# Patient Record
Sex: FEMALE | Race: OTHER | HISPANIC OR LATINO | Employment: UNEMPLOYED | URBAN - METROPOLITAN AREA
[De-identification: names, ages, dates, MRNs, and addresses within clinical notes are randomized per-mention and may not be internally consistent; named-entity substitution may affect disease eponyms.]

---

## 2023-07-01 ENCOUNTER — HOSPITAL ENCOUNTER (EMERGENCY)
Facility: HOSPITAL | Age: 28
Discharge: HOME | End: 2023-07-01
Attending: EMERGENCY MEDICINE | Admitting: EMERGENCY MEDICINE
Payer: MEDICAID

## 2023-07-01 ENCOUNTER — APPOINTMENT (EMERGENCY)
Dept: ULTRASOUND IMAGING | Facility: HOSPITAL | Age: 28
End: 2023-07-01
Payer: MEDICAID

## 2023-07-01 VITALS
TEMPERATURE: 98.5 F | OXYGEN SATURATION: 100 % | RESPIRATION RATE: 16 BRPM | HEART RATE: 81 BPM | SYSTOLIC BLOOD PRESSURE: 139 MMHG | DIASTOLIC BLOOD PRESSURE: 80 MMHG

## 2023-07-01 DIAGNOSIS — O41.8X90 SUBCHORIONIC HEMORRHAGE: ICD-10-CM

## 2023-07-01 DIAGNOSIS — O46.8X9 SUBCHORIONIC HEMORRHAGE: ICD-10-CM

## 2023-07-01 DIAGNOSIS — O46.90 VAGINAL BLEEDING DURING PREGNANCY: Primary | ICD-10-CM

## 2023-07-01 LAB
ALBUMIN SERPL BCP-MCNC: 3.1 G/DL (ref 3.5–5)
ALP SERPL-CCNC: 39 U/L (ref 34–104)
ALT SERPL W P-5'-P-CCNC: 9 U/L (ref 7–52)
ANION GAP SERPL CALCULATED.3IONS-SCNC: 7 MMOL/L
ANION GAP SERPL CALCULATED.3IONS-SCNC: 8 MMOL/L
AST SERPL W P-5'-P-CCNC: 9 U/L (ref 13–39)
ATRIAL RATE: 98 BPM
B-HCG SERPL-ACNC: 9981 MIU/ML (ref 0–5)
BACTERIA UR QL AUTO: NORMAL /HPF
BASOPHILS # BLD AUTO: 0.02 THOUSANDS/ÂΜL (ref 0–0.1)
BASOPHILS NFR BLD AUTO: 0 % (ref 0–1)
BILIRUB SERPL-MCNC: 0.31 MG/DL (ref 0.2–1)
BILIRUB UR QL STRIP: NEGATIVE
BUN SERPL-MCNC: 11 MG/DL (ref 5–25)
BUN SERPL-MCNC: 12 MG/DL (ref 5–25)
CALCIUM ALBUM COR SERPL-MCNC: 7.8 MG/DL (ref 8.3–10.1)
CALCIUM SERPL-MCNC: 7.1 MG/DL (ref 8.4–10.2)
CALCIUM SERPL-MCNC: 9.1 MG/DL (ref 8.4–10.2)
CHLORIDE SERPL-SCNC: 105 MMOL/L (ref 96–108)
CHLORIDE SERPL-SCNC: 113 MMOL/L (ref 96–108)
CLARITY UR: CLEAR
CO2 SERPL-SCNC: 19 MMOL/L (ref 21–32)
CO2 SERPL-SCNC: 25 MMOL/L (ref 21–32)
COLOR UR: YELLOW
CREAT SERPL-MCNC: 0.47 MG/DL (ref 0.6–1.3)
CREAT SERPL-MCNC: 0.62 MG/DL (ref 0.6–1.3)
EOSINOPHIL # BLD AUTO: 0.29 THOUSAND/ÂΜL (ref 0–0.61)
EOSINOPHIL NFR BLD AUTO: 3 % (ref 0–6)
ERYTHROCYTE [DISTWIDTH] IN BLOOD BY AUTOMATED COUNT: 14.1 % (ref 11.6–15.1)
EXT PREGNANCY TEST URINE: POSITIVE
EXT. CONTROL: ABNORMAL
GFR SERPL CREATININE-BSD FRML MDRD: 121 ML/MIN/1.73SQ M
GFR SERPL CREATININE-BSD FRML MDRD: 132 ML/MIN/1.73SQ M
GLUCOSE SERPL-MCNC: 85 MG/DL (ref 65–140)
GLUCOSE SERPL-MCNC: 88 MG/DL (ref 65–140)
GLUCOSE UR STRIP-MCNC: NEGATIVE MG/DL
HCT VFR BLD AUTO: 35.6 % (ref 34.8–46.1)
HGB BLD-MCNC: 11.2 G/DL (ref 11.5–15.4)
HGB UR QL STRIP.AUTO: ABNORMAL
IMM GRANULOCYTES # BLD AUTO: 0.04 THOUSAND/UL (ref 0–0.2)
IMM GRANULOCYTES NFR BLD AUTO: 0 % (ref 0–2)
KETONES UR STRIP-MCNC: NEGATIVE MG/DL
LEUKOCYTE ESTERASE UR QL STRIP: NEGATIVE
LYMPHOCYTES # BLD AUTO: 1.59 THOUSANDS/ÂΜL (ref 0.6–4.47)
LYMPHOCYTES NFR BLD AUTO: 16 % (ref 14–44)
MCH RBC QN AUTO: 27.3 PG (ref 26.8–34.3)
MCHC RBC AUTO-ENTMCNC: 31.5 G/DL (ref 31.4–37.4)
MCV RBC AUTO: 87 FL (ref 82–98)
MONOCYTES # BLD AUTO: 0.61 THOUSAND/ÂΜL (ref 0.17–1.22)
MONOCYTES NFR BLD AUTO: 6 % (ref 4–12)
NEUTROPHILS # BLD AUTO: 7.48 THOUSANDS/ÂΜL (ref 1.85–7.62)
NEUTS SEG NFR BLD AUTO: 75 % (ref 43–75)
NITRITE UR QL STRIP: NEGATIVE
NON-SQ EPI CELLS URNS QL MICRO: NORMAL /HPF
NRBC BLD AUTO-RTO: 0 /100 WBCS
P AXIS: 66 DEGREES
PH UR STRIP.AUTO: 6 [PH] (ref 4.5–8)
PLATELET # BLD AUTO: 305 THOUSANDS/UL (ref 149–390)
PMV BLD AUTO: 11.3 FL (ref 8.9–12.7)
POTASSIUM SERPL-SCNC: 2.8 MMOL/L (ref 3.5–5.3)
POTASSIUM SERPL-SCNC: 3.6 MMOL/L (ref 3.5–5.3)
PR INTERVAL: 180 MS
PROT SERPL-MCNC: 6 G/DL (ref 6.4–8.4)
PROT UR STRIP-MCNC: NEGATIVE MG/DL
QRS AXIS: 63 DEGREES
QRSD INTERVAL: 84 MS
QT INTERVAL: 346 MS
QTC INTERVAL: 441 MS
RBC # BLD AUTO: 4.1 MILLION/UL (ref 3.81–5.12)
RBC #/AREA URNS AUTO: NORMAL /HPF
SODIUM SERPL-SCNC: 138 MMOL/L (ref 135–147)
SODIUM SERPL-SCNC: 139 MMOL/L (ref 135–147)
SP GR UR STRIP.AUTO: >=1.03 (ref 1–1.03)
T WAVE AXIS: 47 DEGREES
UROBILINOGEN UR QL STRIP.AUTO: 0.2 E.U./DL
VENTRICULAR RATE: 98 BPM
WBC # BLD AUTO: 10.03 THOUSAND/UL (ref 4.31–10.16)
WBC #/AREA URNS AUTO: NORMAL /HPF

## 2023-07-01 PROCEDURE — 84702 CHORIONIC GONADOTROPIN TEST: CPT | Performed by: PHYSICIAN ASSISTANT

## 2023-07-01 PROCEDURE — 85025 COMPLETE CBC W/AUTO DIFF WBC: CPT | Performed by: PHYSICIAN ASSISTANT

## 2023-07-01 PROCEDURE — 96360 HYDRATION IV INFUSION INIT: CPT

## 2023-07-01 PROCEDURE — 81001 URINALYSIS AUTO W/SCOPE: CPT

## 2023-07-01 PROCEDURE — 93010 ELECTROCARDIOGRAM REPORT: CPT | Performed by: STUDENT IN AN ORGANIZED HEALTH CARE EDUCATION/TRAINING PROGRAM

## 2023-07-01 PROCEDURE — 99284 EMERGENCY DEPT VISIT MOD MDM: CPT

## 2023-07-01 PROCEDURE — 80048 BASIC METABOLIC PNL TOTAL CA: CPT | Performed by: PHYSICIAN ASSISTANT

## 2023-07-01 PROCEDURE — 80053 COMPREHEN METABOLIC PANEL: CPT | Performed by: PHYSICIAN ASSISTANT

## 2023-07-01 PROCEDURE — 96361 HYDRATE IV INFUSION ADD-ON: CPT

## 2023-07-01 PROCEDURE — 87147 CULTURE TYPE IMMUNOLOGIC: CPT

## 2023-07-01 PROCEDURE — 76801 OB US < 14 WKS SINGLE FETUS: CPT

## 2023-07-01 PROCEDURE — 87186 SC STD MICRODIL/AGAR DIL: CPT

## 2023-07-01 PROCEDURE — 93005 ELECTROCARDIOGRAM TRACING: CPT

## 2023-07-01 PROCEDURE — 81025 URINE PREGNANCY TEST: CPT | Performed by: PHYSICIAN ASSISTANT

## 2023-07-01 PROCEDURE — 36415 COLL VENOUS BLD VENIPUNCTURE: CPT | Performed by: PHYSICIAN ASSISTANT

## 2023-07-01 PROCEDURE — 87086 URINE CULTURE/COLONY COUNT: CPT

## 2023-07-01 RX ADMIN — SODIUM CHLORIDE 1000 ML: 0.9 INJECTION, SOLUTION INTRAVENOUS at 20:45

## 2023-07-02 NOTE — DISCHARGE INSTRUCTIONS
Please refer to the attached information for strict return instructions. If symptoms worsen or new symptoms develop please return to the ER. Please follow-up with your primary care physician for re-evaluation of symptoms.

## 2023-07-02 NOTE — ED NOTES
Dontrell sent to [facility] at this time.       Yoel Alonzo RN  07/01/23 2055

## 2023-07-02 NOTE — ED PROVIDER NOTES
History  Chief Complaint   Patient presents with   • Vaginal Bleeding - Pregnant     [place]. Pt states "I took an at home pregnancy test and I believe I am about 10 weeks pregnant". Pt started today with vaginal bleeding and lower abdominal cramping. Pt states "the bleeding is heavier than normal, bright red blood". Denies clots. This would be her 3rd pregnancy, hx of miscarriage. This is a 25-year-old female approximately 10 weeks pregnant presenting for evaluation of vaginal bleeding. Patient states she had had an at home positive pregnancy test.  Believes LMP was end of March early April, unsure of specific dates. She endorses lower abdominal pain with vaginal bleeding. States vaginal bleeding is spotting, started yesterday. She states she thought it would go away but did not today, notes every time she wipes. No dysuria or hematuria. She denies any vaginal discharge. She has not had an ultrasound confirming pregnancy yet. She does have a history of miscarriage multiple years ago. She denies any blood clots or tissue. She denies any nausea or vomiting. History provided by:  Patient   used: No        None       History reviewed. No pertinent past medical history. History reviewed. No pertinent surgical history. History reviewed. No pertinent family history. I have reviewed and agree with the history as documented. E-Cigarette/Vaping     E-Cigarette/Vaping Substances     Social History     Tobacco Use   • Smoking status: Never   • Smokeless tobacco: Never   Substance Use Topics   • Alcohol use: Not Currently   • Drug use: Never       Review of Systems   Gastrointestinal: Positive for abdominal pain. Negative for nausea and vomiting. Genitourinary: Positive for pelvic pain and vaginal bleeding. Negative for dysuria, flank pain and urgency. All other systems reviewed and are negative. Physical Exam  Physical Exam  Vitals reviewed. Constitutional:       General: She is not in acute distress. Appearance: Normal appearance. She is well-developed and well-groomed. She is not ill-appearing, toxic-appearing or diaphoretic. HENT:      Head: Normocephalic and atraumatic. Right Ear: External ear normal.      Left Ear: External ear normal.      Nose: Nose normal. No congestion or rhinorrhea. Mouth/Throat:      Mouth: Mucous membranes are moist.      Pharynx: Oropharynx is clear. No oropharyngeal exudate or posterior oropharyngeal erythema. Eyes:      General: No scleral icterus. Right eye: No discharge. Left eye: No discharge. Extraocular Movements: Extraocular movements intact. Conjunctiva/sclera: Conjunctivae normal.   Cardiovascular:      Rate and Rhythm: Normal rate and regular rhythm. Pulses: Normal pulses. Heart sounds: No murmur heard. No friction rub. No gallop. Pulmonary:      Effort: Pulmonary effort is normal. No respiratory distress. Breath sounds: Normal breath sounds. No wheezing, rhonchi or rales. Abdominal:      General: Abdomen is flat. There is no distension. Palpations: Abdomen is soft. Tenderness: There is abdominal tenderness in the right lower quadrant and suprapubic area. There is no right CVA tenderness, left CVA tenderness, guarding or rebound. Musculoskeletal:         General: No deformity. Normal range of motion. Cervical back: Normal range of motion and neck supple. Skin:     General: Skin is warm and dry. Coloration: Skin is not jaundiced or pale. Findings: No rash. Neurological:      General: No focal deficit present. Mental Status: She is alert. Psychiatric:         Mood and Affect: Mood normal.         Behavior: Behavior normal. Behavior is cooperative.          Vital Signs  ED Triage Vitals [07/01/23 2025]   Temperature Pulse Respirations Blood Pressure SpO2   98.5 °F (36.9 °C) 105 17 133/79 97 %      Temp Source Heart Rate Source Patient Position - Orthostatic VS BP Location FiO2 (%)   Oral Monitor Sitting Right arm --      Pain Score       --           Vitals:    07/01/23 2025 07/01/23 2314   BP: 133/79 139/80   Pulse: 105 81   Patient Position - Orthostatic VS: Sitting Sitting         Visual Acuity      ED Medications  Medications   sodium chloride 0.9 % bolus 1,000 mL (0 mL Intravenous Stopped 7/1/23 2321)       Diagnostic Studies  Results Reviewed     Procedure Component Value Units Date/Time    Basic metabolic panel [[id number]] Collected: 07/01/23 2136    Lab Status: Final result Specimen: Blood from Arm, Left Updated: 07/01/23 2200     Sodium 138 mmol/L      Potassium 3.6 mmol/L      Chloride 105 mmol/L      CO2 25 mmol/L      ANION GAP 8 mmol/L      BUN 12 mg/dL      Creatinine 0.62 mg/dL      Glucose 85 mg/dL      Calcium 9.1 mg/dL      eGFR 121 ml/min/1.73sq m     Narrative:      [facility] ([facility]D):   •  Stage 1 with normal or high GFR (GFR > 90 mL/min/1.73 square meters)  •  Stage 2 Mild CKD (GFR = 60-89 mL/min/1.73 square meters)  •  Stage 3A Moderate CKD (GFR = 45-59 mL/min/1.73 square meters)  •  Stage 3B Moderate CKD (GFR = 30-44 mL/min/1.73 square meters)  •  Stage 4 Severe CKD (GFR = 15-29 mL/min/1.73 square meters)  •  Stage 5 End Stage CKD (GFR <15 mL/min/1.73 square meters)  Note: GFR calculation is accurate only with a steady state creatinine    hCG, quantitative [[id number]]  (Abnormal) Collected: 07/01/23 2041    Lab Status: Final result Specimen: Blood from Arm, Left Updated: 07/01/23 2144     HCG, Quant 9,981 mIU/mL     Narrative:       Expected Ranges:    HCG results between 5 and 25 mIU/mL may be indicative of early pregnancy but should be interpreted in light of the total clinical presentation. HCG can rise to detectable levels in tone and post menopausal women (0-11.6 mIU/mL).      Approximate               Approximate HCG  Gestation age          Concentration ( mIU/mL)  _____________          ______________________   Kat Dk                      HCG values  0.2-1                       5-50  1-2                           2-3                         100-5000  3-4                         500-28752  4-5                         1000-95618  5-6                         93601-690499  6-8                         97833-813362  8-12                        [id number]      Urine Microscopic [[id number]]  (Normal) Collected: 07/01/23 2050    Lab Status: Final result Specimen: Urine, Clean Catch Updated: 07/01/23 2138     RBC, UA 1-2 /hpf      WBC, UA 1-2 /hpf      Epithelial Cells Occasional /hpf      Bacteria, UA None Seen /hpf     Comprehensive metabolic panel [[id number]]  (Abnormal) Collected: 07/01/23 2041    Lab Status: Final result Specimen: Blood from Arm, Left Updated: 07/01/23 2116     Sodium 139 mmol/L      Potassium 2.8 mmol/L      Chloride 113 mmol/L      CO2 19 mmol/L      ANION GAP 7 mmol/L      BUN 11 mg/dL      Creatinine 0.47 mg/dL      Glucose 88 mg/dL      Calcium 7.1 mg/dL      Corrected Calcium 7.8 mg/dL      AST 9 U/L      ALT 9 U/L      Alkaline Phosphatase 39 U/L      Total Protein 6.0 g/dL      Albumin 3.1 g/dL      Total Bilirubin 0.31 mg/dL      eGFR 132 ml/min/1.73sq m     Narrative:      [facility] ([facility]):   •  Stage 1 with normal or high GFR (GFR > 90 mL/min/1.73 square meters)  •  Stage 2 Mild CKD (GFR = 60-89 mL/min/1.73 square meters)  •  Stage 3A Moderate CKD (GFR = 45-59 mL/min/1.73 square meters)  •  Stage 3B Moderate CKD (GFR = 30-44 mL/min/1.73 square meters)  •  Stage 4 Severe CKD (GFR = 15-29 mL/min/1.73 square meters)  •  Stage 5 End Stage CKD (GFR <15 mL/min/1.73 square meters)  Note: GFR calculation is accurate only with a steady state creatinine    CBC and differential [[id number]]  (Abnormal) Collected: 07/01/23 2041    Lab Status: Final result Specimen: Blood from Arm, Left Updated: 07/01/23 2056     WBC 10.03 Thousand/uL      RBC 4.10 Million/uL      Hemoglobin 11.2 g/dL      Hematocrit 35.6 %      MCV 87 fL      MCH 27.3 pg      MCHC 31.5 g/dL      RDW 14.1 %      MPV 11.3 fL      Platelets 223 Thousands/uL      nRBC 0 /100 WBCs      Neutrophils Relative 75 %      Immat GRANS % 0 %      Lymphocytes Relative 16 %      Monocytes Relative 6 %      Eosinophils Relative 3 %      Basophils Relative 0 %      Neutrophils Absolute 7.48 Thousands/µL      Immature Grans Absolute 0.04 Thousand/uL      Lymphocytes Absolute 1.59 Thousands/µL      Monocytes Absolute 0.61 Thousand/µL      Eosinophils Absolute 0.29 Thousand/µL      Basophils Absolute 0.02 Thousands/µL     Urine culture [[id number]] Collected: 07/01/23 2050    Lab Status: In process Specimen: Urine, Clean Catch Updated: 07/01/23 2055    POCT pregnancy, urine [[id number]]  (Abnormal) Resulted: 07/01/23 2052    Lab Status: Final result Updated: 07/01/23 2052     EXT Preg Test, Ur Positive     Control Valid    Urine Macroscopic, POC [[id number]]  (Abnormal) Collected: 07/01/23 2050    Lab Status: Final result Specimen: Urine Updated: 07/01/23 2051     Color, UA Yellow     Clarity, UA Clear     pH, UA 6.0     Leukocytes, UA Negative     Nitrite, UA Negative     Protein, UA Negative mg/dl      Glucose, UA Negative mg/dl      Ketones, UA Negative mg/dl      Urobilinogen, UA 0.2 E.U./dl      Bilirubin, UA Negative     Occult Blood, UA Large     Specific Gravity, UA >=1.030    Narrative:      CLINITEK RESULT                 US OB < 14 weeks with transvaginal   Final Result by Tyrone Mattson MD (07/01 2304)      Single intrauterine gestational sac with yolk sac. Fetal pole not visualized. Cardiac activity not visualized. Recommend follow-up examination to ensure fetal viability. 1.7 x 0.2 x 1.9 cm subchorionic hemorrhage is noted.          Workstation performed: [id number]                    Procedures  ECG 12 Lead Documentation Only    Date/Time: 7/1/2023 9:40 PM    Performed by: Nayeli Layton PA-C  Authorized by: Nayeli Layton PA-C    Indications / Diagnosis:  HypoK  ECG reviewed by me, the ED Provider: yes (Also reviewed by Dr. Spencer Brantley)    Patient location:  ED  Previous ECG:     Previous ECG:  Compared to current    Comparison to cardiac monitor: No    Interpretation:     Interpretation: normal    Quality:     Tracing quality:  Limited by artifact  Rate:     ECG rate:  98    ECG rate assessment: normal    Rhythm:     Rhythm: sinus rhythm    Ectopy:     Ectopy: none    QRS:     QRS axis:  Normal    QRS intervals:  Normal  Conduction:     Conduction: normal    ST segments:     ST segments:  Normal  T waves:     T waves: normal      POC Pelvic US    Date/Time: 7/1/2023 8:45 PM    Performed by: Nayeli Layton PA-C  Authorized by: Nayeli Layton PA-C    Patient location:  ED  Other Assisting Provider: No    Procedure details:     Exam Type:  Diagnostic    Indications: evaluate for IUP and pregnant with vaginal bleeding      Assessment for: determine estimated gestational age, confirm intrauterine pregnancy and evaluate fetal viability      Technique:  Transabdominal obstetric (HCG+) exam    Image quality: non-diagnostic      Image availability:  Not saved  Uterine findings:     Single gestation: identified      Yolk sac: identified      Fetal pole: not identified      Fetal heart rate: not identified    Interpretation:     Ultrasound impressions: indeterminate      Pregnancy findings: indeterminate               ED Course  ED Course as of 07/02/23 0331   Sat Jul 01, 2023 2031 LMP March, unsure exact date   2115 PREGNANCY TEST URINE(!): Positive   2115 Potassium(!): 2.8  Will get EKG   2131 Comprehensive metabolic panel(!)  Will check BMP as CMP may have possible lab error   2150 HCG QUANTITATIVE(!): 9,981   7534 Basic metabolic panel  Normal   0401 US OB < 14 weeks with transvaginal  Single intrauterine gestational sac with yolk sac. Fetal pole not visualized. Cardiac activity not visualized.     Recommend follow-up examination to ensure fetal viability.     1.7 x 0.2 x 1.9 cm subchorionic hemorrhage is noted. Medical Decision Making      DDx including but not limited to: ectopic pregnancy, threatened , missed , incomplete , anemia, coagulopathy, DUB, retained products of conception, PCOS; doubt ovarian torsion or ruptured ovarian cyst.     Patient presenting to the ED for evaluation of vaginal bleeding in the setting of pregnancy. Unsure of exact date of LMP, believes approximately 10 weeks pregnant. Point-of-care ultrasound done by myself bedside, yolk sac noted but no fetal pole or fetal heart activity. Will order [place] for LFTs, kidney function electrolyte abnormalities, hCG quant, UA for evaluation of urinary tract infection, CBC for evaluation of anemia and infection. Transvaginal ultrasound for evaluation of possible ectopic pregnancy. CMP w/ K of 2.8, will order EKG. Will order BMP as it appears to be lab error. BMP unremarkable. hCG quant 2437  TVUS: Single intrauterine gestational sac with yolk sac. Fetal pole not visualized. Cardiac activity not visualized. Recommend follow-up examination to ensure fetal viability. 1.7 x 0.2 x 1.9 cm subchorionic hemorrhage is noted. Reviewed findings with patient bedside, recommend patient follow-up with OB/GYN. Patient provided with prescription for repeat quant in 2 days. Patient from out of town, will follow-up with her OB/GYN. Prior to discharge, discharge instructions were discussed with patient at bedside. Patient was provided both verbal and written instructions. Patient is understanding of the discharge instructions and is agreeable to plan of care. Return precautions were discussed with patient bedside, patient verbalized understanding of signs and symptoms that would necessitate return to the ED.  All questions were answered. Patient was comfortable with the plan of care and discharged to home. Dispo: discharge home with follow up to OBGYN. Patient stable, in no acute distress and non-toxic at discharge. Subchorionic hemorrhage: acute illness or injury  Vaginal bleeding during pregnancy: acute illness or injury  Amount and/or Complexity of Data Reviewed  Labs: ordered. Decision-making details documented in ED Course. Radiology: ordered. Decision-making details documented in ED Course. Disposition  Final diagnoses:   Vaginal bleeding during pregnancy   Subchorionic hemorrhage     Time reflects when diagnosis was documented in both MDM as applicable and the Disposition within this note     Time User Action Codes Description Comment    7/1/2023 11:13 PM Zuleyma Rosa [A91.51] Vaginal bleeding during pregnancy     7/1/2023 11:13 PM Zuleyma Rosa [Q54.5W04,  [address] hemorrhage       ED Disposition     ED Disposition   Discharge    Condition   Stable    Date/Time   Sat Jul 1, 2023 11:10 PM    Comment   [address] discharge to home/self care. Follow-up Information    None         There are no discharge medications for this patient. Outpatient Discharge Orders   hCG, quantitative   Standing Status: Future Standing Exp.  Date: 07/01/24       PDMP Review     None          ED Provider  Electronically Signed by [facility]IL[facility]  07/02/23 7235

## 2023-07-03 ENCOUNTER — HOSPITAL ENCOUNTER (EMERGENCY)
Facility: HOSPITAL | Age: 28
Discharge: HOME | End: 2023-07-03
Attending: EMERGENCY MEDICINE | Admitting: EMERGENCY MEDICINE
Payer: MEDICAID

## 2023-07-03 VITALS
SYSTOLIC BLOOD PRESSURE: 143 MMHG | HEART RATE: 87 BPM | OXYGEN SATURATION: 98 % | DIASTOLIC BLOOD PRESSURE: 85 MMHG | RESPIRATION RATE: 18 BRPM | TEMPERATURE: 98.6 F | WEIGHT: 208.34 LBS

## 2023-07-03 DIAGNOSIS — N93.9 VAGINAL BLEEDING: Primary | ICD-10-CM

## 2023-07-03 LAB — B-HCG SERPL-ACNC: 8508 MIU/ML (ref 0–5)

## 2023-07-03 PROCEDURE — 84702 CHORIONIC GONADOTROPIN TEST: CPT

## 2023-07-03 PROCEDURE — 36415 COLL VENOUS BLD VENIPUNCTURE: CPT

## 2023-07-03 PROCEDURE — 99283 EMERGENCY DEPT VISIT LOW MDM: CPT

## 2023-07-03 RX ORDER — ACETAMINOPHEN 325 MG/1
650 TABLET ORAL ONCE
Status: DISCONTINUED | OUTPATIENT
Start: 2023-07-03 | End: 2023-07-03 | Stop reason: HOSPADM

## 2023-07-03 NOTE — DISCHARGE INSTRUCTIONS
Blood work showed a decrease in the level of a pregnancy hormone, which indicates that you are likely having a miscarriage. Have repeat blood work done in 48 hours, and follow-up with the OB/GYN doctor. Return to the emergency department if symptoms worsen, severe pain, increased bleeding, feeling like you may pass out, or any other symptoms that concern you.

## 2023-07-03 NOTE — ED PROVIDER NOTES
History  Chief Complaint   Patient presents with   • Vaginal Bleeding - Pregnant     States vaginal bleeding since Friday, currently 10 wks preg. Seend on July 1st for same, states bleeding has gotten worse. Denies abd pain. HPI  Miguel Angel Swift is a 25 y.o. female approximately 8w3d pregnant by LMP (4/20?) who presents to the emergency department with vaginal bleeding. She was evaluated in the ER 2 nights ago for vaginal bleeding and had a transvaginal ultrasound which showed an intrauterine gestational sac without fetal pole. Several hours ago she had increased bleeding with passage of some clots and some suprapubic cramping. She has not yet seen OB. She reports having approximately 7 pregnancies in the past, with 2 births. None       History reviewed. No pertinent past medical history. History reviewed. No pertinent surgical history. History reviewed. No pertinent family history. I have reviewed and agree with the history as documented. E-Cigarette/Vaping     E-Cigarette/Vaping Substances     Social History     Tobacco Use   • Smoking status: Never   • Smokeless tobacco: Never   Substance Use Topics   • Alcohol use: Not Currently   • Drug use: Never       Home medications:  None     Allergies:  No Known Allergies     Review of Systems   Constitutional: Negative for fever. Respiratory: Negative for shortness of breath. Cardiovascular: Negative for chest pain and leg swelling. Gastrointestinal: Positive for abdominal pain. Negative for vomiting. Genitourinary: Positive for vaginal bleeding. Neurological: Negative for syncope and light-headedness. All other systems reviewed and are negative.       Physical Exam  ED Triage Vitals [07/03/23 0145]   Temperature Pulse Respirations Blood Pressure SpO2   98.6 °F (37 °C) 87 18 143/85 98 %      Temp Source Heart Rate Source Patient Position - Orthostatic VS BP Location FiO2 (%)   Oral Monitor Sitting Right arm --      Pain Score       No Pain             Orthostatic Vital Signs  Vitals:    07/03/23 0145   BP: 143/85   Pulse: 87   Patient Position - Orthostatic VS: Sitting       Physical Exam  Vitals and nursing note reviewed. Constitutional:       General: She is not in acute distress. Appearance: She is not toxic-appearing. HENT:      Head: Normocephalic. Mouth/Throat:      Mouth: Mucous membranes are moist.   Eyes:      Pupils: Pupils are equal, round, and reactive to light. Cardiovascular:      Rate and Rhythm: Normal rate and regular rhythm. Heart sounds: No murmur heard. Pulmonary:      Effort: Pulmonary effort is normal. No respiratory distress. Breath sounds: Normal breath sounds. No wheezing, rhonchi or rales. Abdominal:      General: Abdomen is flat. There is no distension. Palpations: Abdomen is soft. Tenderness: There is abdominal tenderness (mild) in the suprapubic area. There is no right CVA tenderness, left CVA tenderness, guarding or rebound. Musculoskeletal:      Right lower leg: No edema. Left lower leg: No edema. Skin:     General: Skin is warm and dry. Neurological:      Mental Status: She is alert. ED Medications  Medications   acetaminophen (TYLENOL) tablet 650 mg (650 mg Oral Not Given 7/3/23 0437)       Diagnostic Studies  Results Reviewed     Procedure Component Value Units Date/Time    hCG, quantitative [[id number]]  (Abnormal) Collected: 07/03/23 0245    Lab Status: Final result Specimen: Blood from Arm, Right Updated: 07/03/23 0339     HCG, Quant 8,508 mIU/mL     Narrative:       Expected Ranges:    HCG results between 5 and 25 mIU/mL may be indicative of early pregnancy but should be interpreted in light of the total clinical presentation. HCG can rise to detectable levels in tone and post menopausal women (0-11.6 mIU/mL).      Approximate               Approximate HCG  Gestation age          Concentration ( mIU/mL)  _____________          ______________________ Weeks                      HCG values  0.2-1                       5-50  1-2                           2-3                         100-5000  3-4                         500-50905  4-5                         1000-45914  5-6                         21710-250506  6-8                         51890-243251  8-12                        62164-179679                   No orders to display         Procedures  POC Pelvic US    Date/Time: 7/3/2023 4:00 AM    Performed by: Madhavi Marte MD  Authorized by: Madhavi Marte MD    Patient location:  ED  Other Assisting Provider: Yes (comment) (Dr. Earnstine Sever)    Procedure details:     Exam Type:  Diagnostic    Indications: pregnant with vaginal bleeding      Assessment for: evaluate fetal viability      Technique:  Transabdominal obstetric (HCG+) exam    Views obtained: uterus (transverse and sagittal)      Image quality: limited diagnostic      Image availability:  Images available in PACS  Uterine findings:     Intrauterine pregnancy: identified      Gestational sac: identified      Fetal pole: not identified    Interpretation:     Ultrasound impressions: indeterminate      Pregnancy findings: IUP with threatened miscarriage            ED Course                             SBIRT 22yo+    Flowsheet Row Most Recent Value   Initial Alcohol Screen: US AUDIT-C     1. How often do you have a drink containing alcohol? 0 Filed at: 07/03/2023 0154   2. How many drinks containing alcohol do you have on a typical day you are drinking? 0 Filed at: 07/03/2023 0154   3a. Male UNDER 65: How often do you have five or more drinks on one occasion? 0 Filed at: 07/03/2023 0154   3b. FEMALE Any Age, or MALE 65+: How often do you have 4 or more drinks on one occassion? 0 Filed at: 07/03/2023 0154   Audit-C Score 0 Filed at: 07/03/2023 3465   [place]: How many times in the past year have you. ..     Used an illegal drug or used a prescription medication for non-medical reasons? Never Filed at: 07/03/2023 0154                [facility]  MDM  Kelvin Martinez is a 25 y.o. female approximately 10w4d pregnant by LMP who presents to the emergency department with vaginal bleeding. Workup including vital signs, physical exam, bedside ultrasound, quantitative hCG. Ultrasound with intrauterine gestational sac, indeterminant if fetal pole present. hCG downtrended from 48 hours prior, most likely indicating miscarriage. Plan for repeat hCG in 48 hours and OB/GYN follow-up. Stable for discharge home with OB follow up, discharge instructions and return precautions given. Disposition  Final diagnoses:   Vaginal bleeding     Time reflects when diagnosis was documented in both MDM as applicable and the Disposition within this note     Time User Action Codes Description Comment    7/3/2023  3:56 AM Jad Andrade Add [N93.9] Vaginal bleeding       ED Disposition     ED Disposition   Discharge    Condition   Stable    Date/Time   Mon Jul 3, 2023  3:57 AM    Comment   Kelvin Martinez discharge to home/self care. Follow-up Information     Follow up With Specialties Details Why Contact Info Additional [facility]   [facility].  [address] [address]  [address] [place] [address], [address] [place] [address], [id number] [phone]          There are no discharge medications for this patient. Outpatient Discharge Orders   hCG, quantitative   Standing Status: Future Standing Exp. Date: 07/03/24       PDMP Review     None           ED Provider  Attending physically available and evaluated Kelvin Martinez. I managed the patient along with the ED Attending. Electronically Signed by    Portions of the record may have been created with voice recognition software.   Occasional wrong word or "sound a like" substitutions may have occurred due to the inherent limitations of voice recognition software.   Read the chart carefully and recognize, using context, where substitutions have occurred     Jodi Ortiz MD  07/03/23 5433

## 2023-07-03 NOTE — ED ATTENDING ATTESTATION
7/3/2023  I, Marin Fleischer, DO, saw and evaluated the patient. I have discussed the patient with the resident/non-physician practitioner and agree with the resident's/non-physician practitioner's findings, Plan of Care, and MDM as documented in the resident's/non-physician practitioner's note, except where noted. All available labs and Radiology studies were reviewed. I was present for key portions of any procedure(s) performed by the resident/non-physician practitioner and I was immediately available to provide assistance. At this point I agree with the current assessment done in the Emergency Department. I have conducted an independent evaluation of this patient a history and physical is as follows:    Aristeo SUN DO, saw and evaluated the patient. All available labs and X-rays were reviewed. I discussed the patient with the resident / non-physician and agree with the resident's / non-physician practitioner's findings and plan as documented in the resident's / non-physician practicitioner's note, except where noted. At this point, I agree with the current assessment done in the ED.     NAME: Kelvin Martinez  AGE: 25 y.o. SEX: female  : 1995   MRN: [id number]  ENCOUNTER: [id number]    DATE: 7/3/2023  TIME: 6:53 AM      History of Present Illness   Kelvin Martinez is a 25 y.o. female who presents with Vaginal Bleeding - Pregnant (States vaginal bleeding since Friday, currently 10 wks preg. Seend on  for same, states bleeding has gotten worse. Denies abd pain.)    has no past medical history on file. .     Past Medical History   History reviewed. No pertinent past medical history. Past Surgical History   History reviewed. No pertinent surgical history.     Social History     Social History     Substance and Sexual Activity   Alcohol Use Not Currently     Social History     Substance and Sexual Activity   Drug Use Never     Social History     Tobacco Use   Smoking Status Never Smokeless Tobacco Never       Family History   History reviewed. No pertinent family history. Medications Prior to Admission     Prior to Admission medications    Not on File       Allergies   No Known Allergies    Objective     Vitals:    07/03/23 0145   BP: 143/85   BP Location: Right arm   Pulse: 87   Resp: 18   Temp: 98.6 °F (37 °C)   TempSrc: Oral   SpO2: 98%   Weight: 94.5 kg (208 lb 5.4 oz)     There is no height or weight on file to calculate BMI. No intake or output data in the 24 hours ending 07/03/23 0653  Invasive Devices     None                 Physical Exam  General: awake, alert, no acute distress  Head: normocephalic, atraumatic  Eyes: no scleral icterus  Ears: external ears normal, hearing grossly intact  Nose: external exam grossly normal  Neck: symmetric, No JVD noted, trachea midline  Pulmonary: no respiratory distress, no tachypnea noted  Cardiovascular: appears well perfused  Abdomen: no distention noted, soft and nontender  Musculoskeletal: no deformities noted, tone normal  Neuro: grossly non-focal  Psych: mood and affect appropriate    Lab Results:    Labs Reviewed   HCG, QUANTITATIVE - Abnormal       Result Value Ref Range Status    HCG, Quant 8,508 (*) 0 - 5 mIU/mL Final    Narrative:      Expected Ranges:    HCG results between 5 and 25 mIU/mL may be indicative of early pregnancy but should be interpreted in light of the total clinical presentation. HCG can rise to detectable levels in tone and post menopausal women (0-11.6 mIU/mL).      Approximate               Approximate HCG  Gestation age          Concentration ( mIU/mL)  _____________          ______________________   Hunter Gaunt                      HCG values  0.2-1                       5-50  1-2                           2-3                         100-5000  3-4                         500-12003  4-5                         1000-97821  5-6                         [id number]  6-8 [id number]  8-12                        [id number]           Imaging:   No orders to display   Bedside ultrasound is nonconclusive. Possible fetal pole visualized. Patient did have imaging from  which showed:  "IMPRESSION:     Single intrauterine gestational sac with yolk sac. Fetal pole not visualized. Cardiac activity not visualized.     Recommend follow-up examination to ensure fetal viability.     1.7 x 0.2 x 1.9 cm subchorionic hemorrhage is noted."      Medications given in Emergency Department     Medication Administration - last 24 hours from 2023 0653 to 2023 4235       Date/Time Order Dose Route Action Action by     2023 0437 EDT acetaminophen (TYLENOL) tablet 650 mg 650 mg Oral Not Given Susana Waddell RN          Assessment and Plan  Vaginal Bleeding in early pregnancy    Our bedside ultrasound was inconclusive. Recent ultrasound done on  did show a gestational sac with yolk sac and a subchorionic hemorrhage. Bleeding does appear consistent with a subchorionic hemorrhage however this could be an early spontaneous . Per chart review the patient has a blood type of B+    Workup including vital signs, physical exam, labs. Beta hCG is trending down today. Most likely diagnosis spontaneous . Treatment including repeat beta-hCG in 48 hours, strict return to ER precautions and following up with OB/GYN. Stable for discharge home with OBGYN follow up, discharge instructions and return precautions given. Active Problems: There are no active Hospital Problems. Final Diagnosis:  1.  Vaginal bleeding        ED Course         Critical Care Time  Procedures

## 2023-07-04 LAB — BACTERIA UR CULT: ABNORMAL

## 2023-10-31 ENCOUNTER — HOSPITAL ENCOUNTER (EMERGENCY)
Age: 28
Discharge: HOME | End: 2023-10-31
Attending: EMERGENCY MEDICINE

## 2023-10-31 VITALS
DIASTOLIC BLOOD PRESSURE: 74 MMHG | TEMPERATURE: 98 F | HEIGHT: 67 IN | SYSTOLIC BLOOD PRESSURE: 136 MMHG | BODY MASS INDEX: 33.43 KG/M2 | HEART RATE: 95 BPM | RESPIRATION RATE: 16 BRPM | WEIGHT: 213 LBS | OXYGEN SATURATION: 99 %

## 2023-10-31 DIAGNOSIS — N76.0 BV (BACTERIAL VAGINOSIS): Primary | ICD-10-CM

## 2023-10-31 DIAGNOSIS — B96.89 BV (BACTERIAL VAGINOSIS): Primary | ICD-10-CM

## 2023-10-31 DIAGNOSIS — B37.31 VULVOVAGINAL CANDIDIASIS: ICD-10-CM

## 2023-10-31 LAB
B-HCG UR QL: NEGATIVE
BILIRUB UR QL STRIP.AUTO: NEGATIVE
CLARITY UR REFRACT.AUTO: CLEAR
COLOR UR AUTO: YELLOW
GLUCOSE UR STRIP.AUTO-MCNC: NEGATIVE MG/DL
KETONES UR STRIP.AUTO-MCNC: NEGATIVE MG/DL
LEUKOCYTE ESTERASE UR QL STRIP.AUTO: NEGATIVE
NITRITE UR QL STRIP.AUTO: NEGATIVE
PH UR STRIP.AUTO: 7.5 [PH] (ref 5–8)
PROT UR STRIP.AUTO-MCNC: NEGATIVE MG/DL
RBC UR QL AUTO: NEGATIVE
SP GR UR STRIP.AUTO: 1.02 (ref 1–1.03)
UROBILINOGEN UR STRIP.AUTO-MCNC: 0.2 MG/DL

## 2023-10-31 PROCEDURE — 87491 CHLMYD TRACH DNA AMP PROBE: CPT | Performed by: EMERGENCY MEDICINE

## 2023-10-31 PROCEDURE — 87480 CANDIDA DNA DIR PROBE: CPT | Performed by: EMERGENCY MEDICINE

## 2023-10-31 PROCEDURE — 99283 EMERGENCY DEPT VISIT LOW MDM: CPT

## 2023-10-31 PROCEDURE — 87591 N.GONORRHOEAE DNA AMP PROB: CPT | Performed by: EMERGENCY MEDICINE

## 2023-10-31 PROCEDURE — 87660 TRICHOMONAS VAGIN DIR PROBE: CPT | Performed by: EMERGENCY MEDICINE

## 2023-10-31 PROCEDURE — 81025 URINE PREGNANCY TEST: CPT

## 2023-10-31 PROCEDURE — 99284 EMERGENCY DEPT VISIT MOD MDM: CPT

## 2023-10-31 PROCEDURE — 81003 URINALYSIS AUTO W/O SCOPE: CPT | Performed by: EMERGENCY MEDICINE

## 2023-10-31 PROCEDURE — 87510 GARDNER VAG DNA DIR PROBE: CPT | Performed by: EMERGENCY MEDICINE

## 2023-10-31 RX ORDER — FLUCONAZOLE 150 MG/1
150 TABLET ORAL ONCE
Qty: 2 TABLET | Refills: 0 | Status: SHIPPED | OUTPATIENT
Start: 2023-10-31 | End: 2023-10-31

## 2023-10-31 RX ORDER — METRONIDAZOLE 500 MG/1
500 TABLET ORAL 2 TIMES DAILY
Qty: 30 TABLET | Refills: 0 | Status: SHIPPED | OUTPATIENT
Start: 2023-10-31 | End: 2023-10-31

## 2023-11-01 LAB
C TRACH DNA SPEC QL NAA+PROBE: NEGATIVE
N GONORRHOEA DNA SPEC QL NAA+PROBE: NEGATIVE

## 2023-11-21 ENCOUNTER — HOSPITAL ENCOUNTER (EMERGENCY)
Age: 28
Discharge: HOME | End: 2023-11-21
Attending: EMERGENCY MEDICINE
Payer: MEDICAID

## 2023-11-21 VITALS
WEIGHT: 209 LBS | TEMPERATURE: 98 F | SYSTOLIC BLOOD PRESSURE: 144 MMHG | DIASTOLIC BLOOD PRESSURE: 76 MMHG | OXYGEN SATURATION: 99 % | HEART RATE: 89 BPM | BODY MASS INDEX: 33 KG/M2 | RESPIRATION RATE: 20 BRPM

## 2023-11-21 DIAGNOSIS — B96.89 BV (BACTERIAL VAGINOSIS): ICD-10-CM

## 2023-11-21 DIAGNOSIS — J02.9 VIRAL PHARYNGITIS: Primary | ICD-10-CM

## 2023-11-21 DIAGNOSIS — J06.9 VIRAL UPPER RESPIRATORY ILLNESS: ICD-10-CM

## 2023-11-21 DIAGNOSIS — N76.0 BV (BACTERIAL VAGINOSIS): ICD-10-CM

## 2023-11-21 PROCEDURE — 99282 EMERGENCY DEPT VISIT SF MDM: CPT | Performed by: PHYSICIAN ASSISTANT

## 2023-11-21 PROCEDURE — 99282 EMERGENCY DEPT VISIT SF MDM: CPT

## 2023-11-21 PROCEDURE — 99283 EMERGENCY DEPT VISIT LOW MDM: CPT

## 2023-11-21 RX ORDER — METRONIDAZOLE 7.5 MG/G
1 GEL VAGINAL NIGHTLY
Qty: 70 G | Refills: 0 | Status: SHIPPED | OUTPATIENT
Start: 2023-11-21 | End: 2023-11-28

## 2024-05-30 ENCOUNTER — HOSPITAL ENCOUNTER (EMERGENCY)
Age: 29
Discharge: HOME | End: 2024-05-31
Attending: EMERGENCY MEDICINE
Payer: MEDICAID

## 2024-05-30 DIAGNOSIS — N84.0 UTERINE POLYP: ICD-10-CM

## 2024-05-30 DIAGNOSIS — N92.0 MENORRHAGIA WITH REGULAR CYCLE: Primary | ICD-10-CM

## 2024-05-30 PROCEDURE — 96361 HYDRATE IV INFUSION ADD-ON: CPT

## 2024-05-30 PROCEDURE — 96376 TX/PRO/DX INJ SAME DRUG ADON: CPT

## 2024-05-30 PROCEDURE — 99285 EMERGENCY DEPT VISIT HI MDM: CPT

## 2024-05-30 PROCEDURE — 96374 THER/PROPH/DIAG INJ IV PUSH: CPT

## 2024-05-30 PROCEDURE — 96375 TX/PRO/DX INJ NEW DRUG ADDON: CPT

## 2024-05-31 ENCOUNTER — APPOINTMENT (OUTPATIENT)
Dept: ULTRASOUND IMAGING | Age: 29
End: 2024-05-31
Attending: EMERGENCY MEDICINE
Payer: MEDICAID

## 2024-05-31 VITALS
RESPIRATION RATE: 16 BRPM | WEIGHT: 188 LBS | HEIGHT: 67 IN | OXYGEN SATURATION: 99 % | DIASTOLIC BLOOD PRESSURE: 67 MMHG | TEMPERATURE: 99 F | HEART RATE: 74 BPM | SYSTOLIC BLOOD PRESSURE: 124 MMHG | BODY MASS INDEX: 29.51 KG/M2

## 2024-05-31 LAB
ALBUMIN SERPL-MCNC: 3.2 G/DL (ref 3.4–5)
ALBUMIN/GLOB SERPL: 0.8 {RATIO} (ref 1–2)
ALP LIVER SERPL-CCNC: 63 U/L
ALT SERPL-CCNC: 19 U/L
ANION GAP SERPL CALC-SCNC: 6 MMOL/L (ref 0–18)
AST SERPL-CCNC: 11 U/L (ref 15–37)
B-HCG SERPL-ACNC: <1 MIU/ML
BASOPHILS # BLD AUTO: 0.01 X10(3) UL (ref 0–0.2)
BASOPHILS NFR BLD AUTO: 0.2 %
BILIRUB SERPL-MCNC: 0.4 MG/DL (ref 0.1–2)
BILIRUB UR QL CFM: NEGATIVE
BUN BLD-MCNC: 12 MG/DL (ref 9–23)
CALCIUM BLD-MCNC: 8.7 MG/DL (ref 8.5–10.1)
CHLORIDE SERPL-SCNC: 108 MMOL/L (ref 98–112)
CO2 SERPL-SCNC: 26 MMOL/L (ref 21–32)
COLOR UR AUTO: YELLOW
CREAT BLD-MCNC: 0.81 MG/DL
EGFRCR SERPLBLD CKD-EPI 2021: 99 ML/MIN/1.73M2 (ref 60–?)
EOSINOPHIL # BLD AUTO: 0.11 X10(3) UL (ref 0–0.7)
EOSINOPHIL NFR BLD AUTO: 1.7 %
ERYTHROCYTE [DISTWIDTH] IN BLOOD BY AUTOMATED COUNT: 14.9 %
GLOBULIN PLAS-MCNC: 4.1 G/DL (ref 2.8–4.4)
GLUCOSE BLD-MCNC: 102 MG/DL (ref 70–99)
GLUCOSE UR STRIP.AUTO-MCNC: NEGATIVE MG/DL
HCT VFR BLD AUTO: 34 %
HGB BLD-MCNC: 11.2 G/DL
IMM GRANULOCYTES # BLD AUTO: 0.01 X10(3) UL (ref 0–1)
IMM GRANULOCYTES NFR BLD: 0.2 %
LEUKOCYTE ESTERASE UR QL STRIP.AUTO: NEGATIVE
LYMPHOCYTES # BLD AUTO: 1.32 X10(3) UL (ref 1–4)
LYMPHOCYTES NFR BLD AUTO: 20.3 %
MCH RBC QN AUTO: 28.7 PG (ref 26–34)
MCHC RBC AUTO-ENTMCNC: 32.9 G/DL (ref 31–37)
MCV RBC AUTO: 87.2 FL
MONOCYTES # BLD AUTO: 0.4 X10(3) UL (ref 0.1–1)
MONOCYTES NFR BLD AUTO: 6.2 %
NEUTROPHILS # BLD AUTO: 4.64 X10 (3) UL (ref 1.5–7.7)
NEUTROPHILS # BLD AUTO: 4.64 X10(3) UL (ref 1.5–7.7)
NEUTROPHILS NFR BLD AUTO: 71.4 %
NITRITE UR QL STRIP.AUTO: NEGATIVE
OSMOLALITY SERPL CALC.SUM OF ELEC: 290 MOSM/KG (ref 275–295)
PH UR STRIP.AUTO: 5.5 [PH] (ref 5–8)
PLATELET # BLD AUTO: 248 10(3)UL (ref 150–450)
POTASSIUM SERPL-SCNC: 3.4 MMOL/L (ref 3.5–5.1)
PROT SERPL-MCNC: 7.3 G/DL (ref 6.4–8.2)
RBC # BLD AUTO: 3.9 X10(6)UL
RBC #/AREA URNS AUTO: >10 /HPF
SODIUM SERPL-SCNC: 140 MMOL/L (ref 136–145)
SP GR UR STRIP.AUTO: >=1.03 (ref 1–1.03)
UROBILINOGEN UR STRIP.AUTO-MCNC: 0.2 MG/DL
WBC # BLD AUTO: 6.5 X10(3) UL (ref 4–11)

## 2024-05-31 PROCEDURE — 84702 CHORIONIC GONADOTROPIN TEST: CPT | Performed by: EMERGENCY MEDICINE

## 2024-05-31 PROCEDURE — 76830 TRANSVAGINAL US NON-OB: CPT | Performed by: EMERGENCY MEDICINE

## 2024-05-31 PROCEDURE — 85025 COMPLETE CBC W/AUTO DIFF WBC: CPT | Performed by: EMERGENCY MEDICINE

## 2024-05-31 PROCEDURE — 93975 VASCULAR STUDY: CPT | Performed by: EMERGENCY MEDICINE

## 2024-05-31 PROCEDURE — 80053 COMPREHEN METABOLIC PANEL: CPT

## 2024-05-31 PROCEDURE — 80053 COMPREHEN METABOLIC PANEL: CPT | Performed by: EMERGENCY MEDICINE

## 2024-05-31 PROCEDURE — 87591 N.GONORRHOEAE DNA AMP PROB: CPT | Performed by: EMERGENCY MEDICINE

## 2024-05-31 PROCEDURE — 87491 CHLMYD TRACH DNA AMP PROBE: CPT | Performed by: EMERGENCY MEDICINE

## 2024-05-31 PROCEDURE — 81015 MICROSCOPIC EXAM OF URINE: CPT | Performed by: EMERGENCY MEDICINE

## 2024-05-31 PROCEDURE — 76856 US EXAM PELVIC COMPLETE: CPT | Performed by: EMERGENCY MEDICINE

## 2024-05-31 PROCEDURE — 81001 URINALYSIS AUTO W/SCOPE: CPT | Performed by: EMERGENCY MEDICINE

## 2024-05-31 PROCEDURE — 84702 CHORIONIC GONADOTROPIN TEST: CPT

## 2024-05-31 PROCEDURE — 85025 COMPLETE CBC W/AUTO DIFF WBC: CPT

## 2024-05-31 PROCEDURE — 81514 NFCT DS BV&VAGINITIS DNA ALG: CPT | Performed by: EMERGENCY MEDICINE

## 2024-05-31 RX ORDER — MORPHINE SULFATE 4 MG/ML
INJECTION, SOLUTION INTRAMUSCULAR; INTRAVENOUS
Status: DISCONTINUED
Start: 2024-05-31 | End: 2024-05-31

## 2024-05-31 RX ORDER — POTASSIUM CHLORIDE 20 MEQ/1
40 TABLET, EXTENDED RELEASE ORAL ONCE
Status: COMPLETED | OUTPATIENT
Start: 2024-05-31 | End: 2024-05-31

## 2024-05-31 RX ORDER — ONDANSETRON 2 MG/ML
4 INJECTION INTRAMUSCULAR; INTRAVENOUS ONCE
Status: COMPLETED | OUTPATIENT
Start: 2024-05-31 | End: 2024-05-31

## 2024-05-31 RX ORDER — ONDANSETRON 2 MG/ML
INJECTION INTRAMUSCULAR; INTRAVENOUS
Status: DISCONTINUED
Start: 2024-05-31 | End: 2024-05-31

## 2024-05-31 RX ORDER — MORPHINE SULFATE 4 MG/ML
4 INJECTION, SOLUTION INTRAMUSCULAR; INTRAVENOUS ONCE
Status: COMPLETED | OUTPATIENT
Start: 2024-05-31 | End: 2024-05-31

## 2024-05-31 RX ORDER — SODIUM CHLORIDE 9 MG/ML
INJECTION, SOLUTION INTRAVENOUS ONCE
Status: COMPLETED | OUTPATIENT
Start: 2024-05-31 | End: 2024-05-31

## 2024-05-31 NOTE — ED PROVIDER NOTES
Patient Seen in: [facility] In [place]      History     Chief Complaint   Patient presents with    Abdomen/Flank Pain     Stated Complaint: abdominal pain,  3/29, since then heavy periods    Subjective:   ARSALAN johnston is a 29-year-old female presenting to the ED complaining of abdominal pain.  The history is obtained from patient who is a good historian.  Patient states that she been having painful and heavy menstrual period since her surgical  on .  She was concerned about retained POC.  I asked the patient how many times she has been pregnant in the past.  The patient is uncertain stating \"many times\" estimating approximately 10 times.  She has 2 living children.  When I inquire about her other pregnancies, the patient states that she has had a history of miscarriage as well as multiple abortions in the past.  She states that she has had \"at least 6 or 7 abortions.\"  Patient reports at least 2 in the last year.  She is not currently on any birth control.  She has lower abdominal pain and cramping with her menstrual cycle and states she is changing pads more frequently without any passage of large clots.  No lightheadedness or dizziness.  Her LMP started 2 days ago.  No fevers or chills.  No associated urinary symptoms.  She does have a gynecologist at [facility] that she follows up with regularly.    Objective:   History reviewed. No pertinent past medical history.           History reviewed. No pertinent surgical history.             Social History     Socioeconomic History    Marital status:    Tobacco Use    Smoking status: Never     Passive exposure: Never    Smokeless tobacco: Never   Vaping Use    Vaping status: Never Used   Substance and Sexual Activity    Alcohol use: Yes    Drug use: Never     Social Determinants of Health      Received from [facility] Social Needs Screening - Social Connection    Received from [facility]  Housing              Review of Systems    Positive for stated complaint: abdominal pain,  3/29, since then heavy periods  Other systems are as noted in HPI.  Constitutional and vital signs reviewed.      All other systems reviewed and negative except as noted above.    Physical Exam     ED Triage Vitals   BP 24 0002 125/68   Pulse 24 0002 80   Resp 24 0009 16   Temp 24 0002 98.8 °F (37.1 °C)   Temp src 24 0002 Temporal   SpO2 24 0002 98 %   O2 Device 24 0002 None (Room air)       Current Vitals:   Vital Signs  BP: 124/67  Pulse: 74  Resp: 16  Temp: 98.8 °F (37.1 °C)  Temp src: Oral    Oxygen Therapy  SpO2: 99 %  O2 Device: None (Room air)            Physical Exam  Vitals and nursing note reviewed.   Constitutional:       General: She is not in acute distress.     Appearance: Normal appearance. She is well-developed. She is not ill-appearing or toxic-appearing.   HENT:      Head: Normocephalic and atraumatic.      Right Ear: External ear normal.      Left Ear: External ear normal.      Mouth/Throat:      Mouth: Mucous membranes are moist.      Pharynx: Oropharynx is clear.   Eyes:      Conjunctiva/sclera: Conjunctivae normal.   Cardiovascular:      Rate and Rhythm: Normal rate and regular rhythm.      Heart sounds: Normal heart sounds.   Pulmonary:      Effort: Pulmonary effort is normal.      Breath sounds: Normal breath sounds.   Abdominal:      General: Abdomen is flat. Bowel sounds are normal. There is no distension.      Tenderness: There is no abdominal tenderness.   Genitourinary:     Comments: Minimal amount of blood in vaginal vault.  No active bleeding at cervical os.  No clots.  Musculoskeletal:      Right lower leg: No edema.      Left lower leg: No edema.   Skin:     General: Skin is warm.      Capillary Refill: Capillary refill takes less than 2 seconds.      Findings: No rash.   Neurological:      General: No focal deficit present.      Mental Status:  She is alert and oriented to person, place, and time.   Psychiatric:         Mood and Affect: Mood normal.         Behavior: Behavior normal.               ED Course     Labs Reviewed   COMP METABOLIC PANEL (14) - Abnormal; Notable for the following components:       Result Value    Glucose 102 (*)     Potassium 3.4 (*)     AST 11 (*)     Albumin 3.2 (*)     A/G Ratio 0.8 (*)     All other components within normal limits   URINALYSIS WITH CULTURE REFLEX - Abnormal; Notable for the following components:    Clarity Urine Cloudy (*)     Ketones Urine Trace (*)     Blood Urine Large (*)     Protein Urine 30 mg/dL (*)     All other components within normal limits   UA MICROSCOPIC ONLY, URINE - Abnormal; Notable for the following components:    RBC Urine >10 (*)     Bacteria Urine Rare (*)     Squamous Epi. Cells Moderate (*)     All other components within normal limits   CBC W/ DIFFERENTIAL - Abnormal; Notable for the following components:    HGB 11.2 (*)     HCT 34.0 (*)     All other components within normal limits   HCG, BETA SUBUNIT (QUANT PREGNANCY TEST) - Normal   ICTOTEST - Normal   CBC WITH DIFFERENTIAL WITH PLATELET    Narrative:     The following orders were created for panel order CBC With Differential With Platelet.  Procedure                               Abnormality         Status                     ---------                               -----------         ------                     CBC W/ DIFFERENTIAL[[id number]]          Abnormal            Final result                 Please view results for these tests on the individual orders.   VAGINITIS VAGINOSIS PCR PANEL   CHLAMYDIA/GONOCOCCUS, [place]                      MDM      History is obtained from patient who is a good historian.  Differential diagnosis includes fibroids, endometriosis, pregnancy, not likely retained POC's, uterine polyp, UTI not likely but considered, STD,  Ovarian cyst, anemia.  Testing considered and ordered includes CBC, CMP, hCG  quantitative levels, UA, GC chlamydia, vaginitis and vaginosis panel.  CBC reviewed with hemoglobin 11.2.  No significant anemia.  Platelet count is normal.  CMP reviewed with mild hypokalemia with potassium of 3.4.  hCG quantitative levels are negative.  UA reviewed with large blood and greater than 10 RBCs with rare bacteria and moderate squamous epithelial cells consistent with contamination.  No significant WBCs present, negative nitrates and leukocyte esterase.  Hematuria due to menstruation.  No evidence for UTI.  Ultrasound was obtained with results as noted below.      Pelvic ultrasound    Comparison: None      IMPRESSION:    Fluid is noted within the endometrial canal.  Endometrial stripe thickness is approximately 4.3 mm.    There is a small echogenic nodular focus projecting into the fluid in the endometrial canal, suspicious for polyp.  This measures 0.5 x 0.3 x 0.3 cm in maximum diameter.    Ovaries are unremarkable.  Doppler flow is seen in the ovaries.    No free fluid is seen.    Previous records reviewed.  The patient has been seen for BV as well as vulvovaginal candidiasis in 2023.  She has been seen for vaginal bleeding in the past as well with pregnancy.  According to an OB note from 2022, the patient was a  at that time.  It was also noted that she had a history of a uterine fibroid.    Discussed all results with patient as well as plan for discharge with outpatient follow-up with her gynecologist for reevaluation of her symptoms.  There was an area suspicious for polyp which could be contributing to her symptoms.  GC chlamydia as well as vaginitis and vaginosis panel are pending at discharge.  Recommend returning to ED if any symptoms worsen, persist, or new symptoms develop.  At this time, vital signs are stable, there is no significant bleeding on examination, hemoglobin at 11.2.  May use Tylenol and/or Motrin over-the-counter as directed as needed for pain or cramping.                          Medical Decision Making      Disposition and Plan     Clinical Impression:  1. Menorrhagia with regular cycle    2. Uterine polyp         Disposition:  Discharge  5/31/2024  4:21 am    Follow-up:  YOUR GYNECOLOGIST    Schedule an appointment as soon as possible for a visit in 2 day(s)      [facility] in [address] [address]  [phone]  Follow up  IF SYMPTOMS WORSEN, PERSIST, OR NEW SYMPTOMS DEVEL          Medications Prescribed:  There are no discharge medications for this patient.

## 2024-06-03 LAB
C TRACH DNA SPEC QL NAA+PROBE: NEGATIVE
N GONORRHOEA DNA SPEC QL NAA+PROBE: NEGATIVE

## 2024-06-04 LAB
BV BACTERIA DNA VAG QL NAA+PROBE: POSITIVE
C GLABRATA DNA VAG QL NAA+PROBE: NEGATIVE
C KRUSEI DNA VAG QL NAA+PROBE: NEGATIVE
CANDIDA DNA VAG QL NAA+PROBE: NEGATIVE
T VAGINALIS DNA VAG QL NAA+PROBE: NEGATIVE

## 2024-06-05 RX ORDER — METRONIDAZOLE 500 MG/1
500 TABLET ORAL 2 TIMES DAILY
Qty: 14 TABLET | Refills: 0 | Status: SHIPPED | OUTPATIENT
Start: 2024-06-05 | End: 2024-06-12

## 2024-06-05 RX ORDER — METRONIDAZOLE 500 MG/1
500 TABLET ORAL 2 TIMES DAILY
Qty: 14 TABLET | Refills: 0 | Status: SHIPPED | OUTPATIENT
Start: 2024-06-05 | End: 2024-06-05

## 2024-09-17 ENCOUNTER — TELEPHONE (OUTPATIENT)
Dept: OBGYN UNIT | Facility: HOSPITAL | Age: 29
End: 2024-09-17

## 2024-12-04 ENCOUNTER — HOSPITAL ENCOUNTER (EMERGENCY)
Age: 29
Discharge: HOME | End: 2024-12-04
Attending: EMERGENCY MEDICINE
Payer: MEDICAID

## 2024-12-04 VITALS
DIASTOLIC BLOOD PRESSURE: 70 MMHG | RESPIRATION RATE: 18 BRPM | HEART RATE: 80 BPM | BODY MASS INDEX: 31.39 KG/M2 | SYSTOLIC BLOOD PRESSURE: 122 MMHG | OXYGEN SATURATION: 100 % | WEIGHT: 200 LBS | HEIGHT: 67 IN | TEMPERATURE: 98 F

## 2024-12-04 DIAGNOSIS — S61.210A LACERATION OF RIGHT INDEX FINGER WITHOUT FOREIGN BODY WITHOUT DAMAGE TO NAIL, INITIAL ENCOUNTER: Primary | ICD-10-CM

## 2024-12-04 PROCEDURE — 90471 IMMUNIZATION ADMIN: CPT

## 2024-12-04 PROCEDURE — 99283 EMERGENCY DEPT VISIT LOW MDM: CPT

## 2024-12-04 NOTE — ED PROVIDER NOTES
Patient Seen in: [facility] In [place]      History     Chief Complaint   Patient presents with    Laceration/Abrasion     Stated Complaint: left 2nd digit lac happend at 0930 pm last night    Subjective:   HPI      29-year-old female.  Right-hand-dominant.  Yesterday evening around 9 PM the patient's right index finger sustained a laceration.  A sharp edge on a pair of tongs caused a laceration.  She now arrives for evaluation, 20 hours later.  Unsure of tetanus status    Objective:     History reviewed. No pertinent past medical history.           History reviewed. No pertinent surgical history.             Social History     Socioeconomic History    Marital status:    Tobacco Use    Smoking status: Never     Passive exposure: Never    Smokeless tobacco: Never   Vaping Use    Vaping status: Never Used   Substance and Sexual Activity    Alcohol use: Yes    Drug use: Never     Social Drivers of Health      Received from [employer]    [place] [employer] Social Needs Screening - Social Connection    Received from [facility]                  Physical Exam     ED Triage Vitals [12/04/24 1508]   /74   Pulse 83   Resp 16   Temp 98.4 °F (36.9 °C)   Temp src Temporal   SpO2 99 %   O2 Device None (Room air)       Current Vitals:   Vital Signs  BP: 125/74  Pulse: 83  Resp: 16  Temp: 98.4 °F (36.9 °C)  Temp src: Temporal    Oxygen Therapy  SpO2: 99 %  O2 Device: None (Room air)        Physical Exam  Gen: Well appearing, well groomed, alert and aware x 3  Skin: 2 lacerations to the distal pad of the index finger.  Adjacent to 1 another.  More medial aspect is 1.5 cm.  Lateral aspect 0.5 cm.  Wound edges demonstrate granulation.  Neuro:  Normal Gait    ED Course   Labs Reviewed - No data to display                MDM          Wound to the tip of the right index finger, distal pad.  Nearly 20 hours old.  Moderate amount of dried blood.  Finger soaked in saline and peroxide and will  reevaluate.  Tetanus will be updated    After irrigation, the wound was noted to have granulated healing.  Primary closure no longer viable.  Site thoroughly cleaned and Steri-Stripped aligning wound edges to the best my ability.  Bulky dressing applied.  Wound care instructions detailed.    Medical Decision Making      Disposition and Plan     Clinical Impression:  1. Laceration of right index finger without foreign body without damage to nail, initial encounter         Disposition:  There is no disposition on file for this visit.  There is no disposition time on file for this visit.    Follow-up:  No follow-up provider specified.        Medications Prescribed:  There are no discharge medications for this patient.          Supplementary Documentation:

## 2024-12-04 NOTE — DISCHARGE INSTRUCTIONS
Leave original dressing for 48 hours.  Removed.  When at home and at night, open to air.  Bandage when out of the house.  Total healing time 7 to 10 days.

## 2025-02-17 ENCOUNTER — HOSPITAL ENCOUNTER (EMERGENCY)
Age: 30
Discharge: HOME | End: 2025-02-18
Attending: EMERGENCY MEDICINE
Payer: MEDICAID

## 2025-02-17 DIAGNOSIS — H10.32 ACUTE CONJUNCTIVITIS OF LEFT EYE, UNSPECIFIED ACUTE CONJUNCTIVITIS TYPE: ICD-10-CM

## 2025-02-17 DIAGNOSIS — H66.002 ACUTE SUPPURATIVE OTITIS MEDIA OF LEFT EAR WITHOUT SPONTANEOUS RUPTURE OF TYMPANIC MEMBRANE, RECURRENCE NOT SPECIFIED: Primary | ICD-10-CM

## 2025-02-17 LAB — SARS-COV-2 RNA RESP QL NAA+PROBE: NOT DETECTED

## 2025-02-17 PROCEDURE — 87430 STREP A AG IA: CPT | Performed by: EMERGENCY MEDICINE

## 2025-02-17 PROCEDURE — 87502 INFLUENZA DNA AMP PROBE: CPT | Performed by: EMERGENCY MEDICINE

## 2025-02-17 PROCEDURE — 99284 EMERGENCY DEPT VISIT MOD MDM: CPT

## 2025-02-17 PROCEDURE — 99283 EMERGENCY DEPT VISIT LOW MDM: CPT

## 2025-02-18 VITALS
OXYGEN SATURATION: 100 % | BODY MASS INDEX: 31.39 KG/M2 | HEIGHT: 67 IN | DIASTOLIC BLOOD PRESSURE: 97 MMHG | SYSTOLIC BLOOD PRESSURE: 139 MMHG | TEMPERATURE: 98 F | RESPIRATION RATE: 18 BRPM | HEART RATE: 102 BPM | WEIGHT: 200 LBS

## 2025-02-18 LAB
POCT INFLUENZA A: NEGATIVE
POCT INFLUENZA B: NEGATIVE

## 2025-02-18 RX ORDER — AMOXICILLIN 875 MG/1
875 TABLET, COATED ORAL ONCE
Status: COMPLETED | OUTPATIENT
Start: 2025-02-18 | End: 2025-02-18

## 2025-02-18 RX ORDER — POLYMYXIN B SULFATE AND TRIMETHOPRIM 1; 10000 MG/ML; [USP'U]/ML
1 SOLUTION OPHTHALMIC
Qty: 10 ML | Refills: 0 | Status: SHIPPED | OUTPATIENT
Start: 2025-02-18 | End: 2025-02-25

## 2025-02-18 NOTE — ED PROVIDER NOTES
Patient Seen in: [facility] In [place]      History     Chief Complaint   Patient presents with    Cough/URI    Ear Problem Pain     Stated Complaint: URI x4-5 days. Pt now has left ear pain.    Subjective:   HPI      Patient is a 29-year-old female presents to ED for evaluation of cold symptoms.  Symptoms present for 5 days.  Her chief complaint is left ear pain.  Also complains of left eye redness and drainage.  She has runny nose and cough and sore throat.  Son is also sick with similar symptoms.  Patient denies other complaints.    Objective:     History reviewed. No pertinent past medical history.           History reviewed. No pertinent surgical history.             Social History     Socioeconomic History    Marital status:    Tobacco Use    Smoking status: Never     Passive exposure: Never    Smokeless tobacco: Never   Vaping Use    Vaping status: Never Used   Substance and Sexual Activity    Alcohol use: Yes     Comment: occ    Drug use: Never     Social Drivers of Health      Received from [facility]                  Physical Exam     ED Triage Vitals [02/17/25 2314]   /87   Pulse 113   Resp 18   Temp 97.9 °F (36.6 °C)   Temp src Oral   SpO2 100 %   O2 Device None (Room air)       Current Vitals:   Vital Signs  BP: 154/87  Pulse: 113  Resp: 18  Temp: 97.9 °F (36.6 °C)  Temp src: Oral    Oxygen Therapy  SpO2: 100 %  O2 Device: None (Room air)        Physical Exam  GENERAL: No acute distress, well appearing and non-toxic, Alert and oriented X 3   HEENT: Normocephalic, atraumatic.  Moist mucous membranes.  Pupils equal round reactive to light accommodation, extraocular motion is intact, sclerae injected on the left, conjunctiva is pink.  Oropharynx is unremarkable, no exudate.  Left TM erythematous   NECK: Supple, trachea midline, no lymphadenopathy.   LUNG: Lungs clear to auscultation bilaterally, no wheezing, no rales, no rhonchi.  CARDIOVASCULAR: Regular  rate and rhythm.  Normal S1S2.  No S3S4 or murmur.  SKIN:  warm and dry  NEURO:  no focal deficits     ED Course     Labs Reviewed   RAPID SARS-COV-2 BY PCR - Normal   POCT FLU TEST - Normal    Narrative:     This assay is a rapid molecular in vitro test utilizing nucleic acid amplification of influenza A and B viral RNA.   RAPID STREP A SCREEN (LC) - Normal    Narrative:     A confirmatory culture is recommended if clinically indicated.            Medications   amoxicillin (Amoxil) tab 875 mg (has no administration in time range)            MDM      Patient is a 29-year-old female presents to ED for evaluation of cold symptoms, left ear pain, left eye redness.  Differential otitis media, otitis externa, COVID, flu, strep.  COVID, flu, strep all negative.  Patient with documented left otitis.  Will place on amoxicillin.  Patient also given Polytrim for conjunctivitis.  Patient told nurse but not me that she had a positive pregnancy test about 3 weeks ago and had some vaginal bleeding but does not think she is currently pregnant does not want to be seen for this currently.  Advised her to follow-up with OB or recheck a pregnancy test at home.    Patient was screened and evaluated during this visit.   As a treating physician attending to the patient, I determined, within reasonable clinical confidence and prior to discharge, that an emergency medical condition was not or was no longer present.  There was no indication for further evaluation, treatment or admission on an emergency basis.  Comprehensive verbal and written discharge and follow-up instructions were provided to help prevent relapse or worsening.  Patient was instructed to follow-up with her primary care provider for further evaluation and treatment, but to return immediately to the ER for worsening, concerning, new, changing or persisting symptoms.  I discussed the case with the patient and they had no questions, complaints, or concerns.  Patient felt  comfortable going home.          Medical Decision Making      Disposition and Plan     Clinical Impression:  1. Acute suppurative otitis media of left ear without spontaneous rupture of tympanic membrane, recurrence not specified    2. Acute conjunctivitis of left eye, unspecified acute conjunctivitis type         Disposition:  Discharge  2/18/2025 12:16 am    Follow-up:  Nonstaff, Physician    Follow up  As needed    Akiko Burgess MD  [address] [place] [address] [place] [id number] [phone]    Follow up  As needed          Medications Prescribed:  Current Discharge Medication List        START taking these medications    Details   amoxicillin clavulanate 875-125 MG Oral Tab Take 1 tablet by mouth 2 (two) times daily for 7 days.  Qty: 14 tablet, Refills: 0      polymyxin B-trimethoprim 49522-2.1 UNIT/ML-% Ophthalmic Solution Apply 1 drop to eye Q3H While Awake for 7 days.  Qty: 10 mL, Refills: 0                 Supplementary Documentation:

## 2025-02-18 NOTE — ED INITIAL ASSESSMENT (HPI)
Patient to ER with c/o left sided ear pain for the past 2-3 days. Left eye redness with crusting for the past 5 days. Sore throat for the past 5 days, denies any SOB/SAMREEN.

## 2025-02-24 ENCOUNTER — HOSPITAL ENCOUNTER (EMERGENCY)
Age: 30
Discharge: HOME | End: 2025-02-25
Attending: EMERGENCY MEDICINE
Payer: MEDICAID

## 2025-02-24 ENCOUNTER — APPOINTMENT (OUTPATIENT)
Dept: ULTRASOUND IMAGING | Age: 30
End: 2025-02-24
Attending: EMERGENCY MEDICINE
Payer: MEDICAID

## 2025-02-24 DIAGNOSIS — O20.0 THREATENED ABORTION (HCC): Primary | ICD-10-CM

## 2025-02-24 LAB
BASOPHILS # BLD AUTO: 0.04 X10(3) UL (ref 0–0.2)
BASOPHILS NFR BLD AUTO: 0.4 %
BUN BLD-MCNC: 8 MG/DL (ref 7–18)
CHLORIDE BLD-SCNC: 102 MMOL/L (ref 98–112)
CO2 BLD-SCNC: 23 MMOL/L (ref 21–32)
CREAT BLD-MCNC: 0.7 MG/DL
EGFRCR SERPLBLD CKD-EPI 2021: 119 ML/MIN/1.73M2 (ref 60–?)
EOSINOPHIL # BLD AUTO: 0.28 X10(3) UL (ref 0–0.7)
EOSINOPHIL NFR BLD AUTO: 2.5 %
ERYTHROCYTE [DISTWIDTH] IN BLOOD BY AUTOMATED COUNT: 13.6 %
GLUCOSE BLD-MCNC: 95 MG/DL (ref 70–99)
HCT VFR BLD AUTO: 35 %
HCT VFR BLD CALC: 35 %
HGB BLD-MCNC: 11.6 G/DL
IMM GRANULOCYTES # BLD AUTO: 0.05 X10(3) UL (ref 0–1)
IMM GRANULOCYTES NFR BLD: 0.4 %
ISTAT IONIZED CALCIUM FOR CHEM 8: 1.2 MMOL/L (ref 1.12–1.32)
LYMPHOCYTES # BLD AUTO: 2.01 X10(3) UL (ref 1–4)
LYMPHOCYTES NFR BLD AUTO: 17.8 %
MCH RBC QN AUTO: 28.7 PG (ref 26–34)
MCHC RBC AUTO-ENTMCNC: 33.1 G/DL (ref 31–37)
MCV RBC AUTO: 86.6 FL
MONOCYTES # BLD AUTO: 0.65 X10(3) UL (ref 0.1–1)
MONOCYTES NFR BLD AUTO: 5.8 %
NEUTROPHILS # BLD AUTO: 8.24 X10 (3) UL (ref 1.5–7.7)
NEUTROPHILS # BLD AUTO: 8.24 X10(3) UL (ref 1.5–7.7)
NEUTROPHILS NFR BLD AUTO: 73.1 %
PLATELET # BLD AUTO: 316 10(3)UL (ref 150–450)
POTASSIUM BLD-SCNC: 3.4 MMOL/L (ref 3.6–5.1)
RBC # BLD AUTO: 4.04 X10(6)UL
RH BLOOD TYPE: POSITIVE
SODIUM BLD-SCNC: 139 MMOL/L (ref 136–145)
WBC # BLD AUTO: 11.3 X10(3) UL (ref 4–11)

## 2025-02-24 PROCEDURE — 80048 BASIC METABOLIC PNL TOTAL CA: CPT | Performed by: EMERGENCY MEDICINE

## 2025-02-24 PROCEDURE — 99285 EMERGENCY DEPT VISIT HI MDM: CPT

## 2025-02-24 PROCEDURE — 96360 HYDRATION IV INFUSION INIT: CPT

## 2025-02-24 PROCEDURE — 86901 BLOOD TYPING SEROLOGIC RH(D): CPT | Performed by: EMERGENCY MEDICINE

## 2025-02-24 PROCEDURE — 85025 COMPLETE CBC W/AUTO DIFF WBC: CPT | Performed by: EMERGENCY MEDICINE

## 2025-02-24 PROCEDURE — 76801 OB US < 14 WKS SINGLE FETUS: CPT | Performed by: EMERGENCY MEDICINE

## 2025-02-24 PROCEDURE — 84702 CHORIONIC GONADOTROPIN TEST: CPT | Performed by: EMERGENCY MEDICINE

## 2025-02-24 PROCEDURE — 99284 EMERGENCY DEPT VISIT MOD MDM: CPT

## 2025-02-24 PROCEDURE — 76817 TRANSVAGINAL US OBSTETRIC: CPT | Performed by: EMERGENCY MEDICINE

## 2025-02-24 PROCEDURE — 80047 BASIC METABLC PNL IONIZED CA: CPT

## 2025-02-24 PROCEDURE — 86900 BLOOD TYPING SEROLOGIC ABO: CPT | Performed by: EMERGENCY MEDICINE

## 2025-02-25 VITALS
HEART RATE: 95 BPM | SYSTOLIC BLOOD PRESSURE: 133 MMHG | HEIGHT: 67 IN | OXYGEN SATURATION: 100 % | BODY MASS INDEX: 31.39 KG/M2 | WEIGHT: 200 LBS | TEMPERATURE: 99 F | DIASTOLIC BLOOD PRESSURE: 89 MMHG | RESPIRATION RATE: 18 BRPM

## 2025-02-25 LAB
ANION GAP SERPL CALC-SCNC: 8 MMOL/L (ref 0–18)
B-HCG SERPL-ACNC: ABNORMAL MIU/ML
BUN BLD-MCNC: 9 MG/DL (ref 9–23)
CALCIUM BLD-MCNC: 9.4 MG/DL (ref 8.7–10.6)
CHLORIDE SERPL-SCNC: 102 MMOL/L (ref 98–112)
CO2 SERPL-SCNC: 26 MMOL/L (ref 21–32)
CREAT BLD-MCNC: 0.72 MG/DL
EGFRCR SERPLBLD CKD-EPI 2021: 115 ML/MIN/1.73M2 (ref 60–?)
GLUCOSE BLD-MCNC: 90 MG/DL (ref 70–99)
OSMOLALITY SERPL CALC.SUM OF ELEC: 280 MOSM/KG (ref 275–295)
POTASSIUM SERPL-SCNC: 3.4 MMOL/L (ref 3.5–5.1)
SODIUM SERPL-SCNC: 136 MMOL/L (ref 136–145)

## 2025-02-25 NOTE — ED QUICK NOTES
Rounding Completed    Plan of Care reviewed. Waiting for US results.  Elimination needs assessed.  Provided water.    Bed is locked and in lowest position. Call light within reach.

## 2025-02-25 NOTE — ED QUICK NOTES
Unable to do discharge vitals, pt left prior to receiving DC papers after talking with MD. Pt left IV On counter.

## 2025-02-25 NOTE — ED INITIAL ASSESSMENT (HPI)
Pt to ED with vaginal bleeding x3 weeks, spotting. Noted when wiping. Denies abdominal pain. +pregnancy, , LMP 2024

## 2025-02-25 NOTE — ED PROVIDER NOTES
Patient Seen in: [facility] In [place]      History     Chief Complaint   Patient presents with    Pregnancy Issues     Stated Complaint: Patient 9 weeks pregnant, vaginal bleeding X 3 weeks    Subjective:   HPI      29-year-old female  0 10 2, approximately 9 weeks pregnant by dates presents emergency room for evaluation of vaginal bleeding, states she is been having vaginal spotting for the last 3 weeks.  Patient denies any acute change or symptoms states that she decided to come tonight because she wanted to find out what was going on.  Patient denies any abdominal or pelvic pain denies back or flank pain.  Denies urinary symptoms.  Denies fevers or chills.  Denies trauma.    Objective:     No pertinent past medical history.            No pertinent past surgical history.              No pertinent social history.                Physical Exam     ED Triage Vitals [25]   /83   Pulse 94   Resp 16   Temp 98.7 °F (37.1 °C)   Temp src Oral   SpO2 99 %   O2 Device None (Room air)       Current Vitals:   Vital Signs  BP: 133/89  Pulse: 95  Resp: 18  Temp: 99 °F (37.2 °C)  Temp src: Temporal    Oxygen Therapy  SpO2: 100 %  O2 Device: None (Room air)        Physical Exam  GENERAL: Patient is awake, alert, well-appearing, in no acute distress.  HEENT:  no scleral icterus.  Mucous membranes are moist  HEART: Regular rate and rhythm, no murmurs.  LUNGS: Clear to auscultation bilaterally.  No Rales, no rhonchi, no wheezing, no stridor.  ABDOMEN: Soft, nondistended,non tender, bowel sounds are present, no rebound, no rigidity, no guarding.no pulsatile masses. No CVA tenderness  EXTREMITIES: No peripheral edema, no calf tenderness  Pelvic exam: Patient declined    ED Course     Labs Reviewed   BASIC METABOLIC PANEL (8) - Abnormal; Notable for the following components:       Result Value    Potassium 3.4 (*)     All other components within normal limits   CBC WITH DIFFERENTIAL WITH  PLATELET - Abnormal; Notable for the following components:    WBC 11.3 (*)     HGB 11.6 (*)     Neutrophil Absolute Prelim 8.24 (*)     Neutrophil Absolute 8.24 (*)     All other components within normal limits   HCG, BETA SUBUNIT (QUANT PREGNANCY TEST) - Abnormal; Notable for the following components:    Hcg Quantitative 16,699.8 (*)     All other components within normal limits   POCT ISTAT CHEM8 CARTRIDGE - Abnormal; Notable for the following components:    ISTAT Potassium 3.4 (*)     All other components within normal limits   ABORH (BLOOD TYPE)                   MDM        Differential diagnosis before testing includes but not limited to threatened AB, missed AB, complete AB, ectopic pregnancy, which is a medical condition that poses a threat to life/function      Radiographic images  I personally reviewed the radiographs and my individual interpretation shows no free fluid noted  I also reviewed the official reports that showed mixed echogenicity structure within the endometrium may represent early IUP or failed IUP no discrete yolk sac or fetal pole identified.      Course of Events during Emergency Room Visit include IV established blood work obtained.  CBC white 11.3 hemoglobin 11.6 platelet 316.  Beta-hCG 16,699.  Blood type B+.  Chemistry was unremarkable.  Ultrasound performed, I discussed all results with patient, discussed the possibility of early IUP or failed IUP, repeat beta-hCG in 48 hours is ordered and patient instructed follow-up with OB, call in the morning for appointment and return to ER if any change or worsening symptoms.  Patient agrees with plan was discharged good condition    Shared decision making was utilized           Disposition:      Discharge  I have discussed with the patient the results of test, differential diagnosis, treatment plan, warning signs and symptoms which should prompt immediate return.  They expressed understanding of these instructions and agrees to the following  plan provided.  They were given written discharge instructions and agrees to return for any concerns and voiced understanding and all questions were answered.    Note to patient: The  [employer] Act makes medical notes like these available to patients in the interest of transparency. However, this is a medical document intended as peer to peer communication. It is written in medical language and may contain abbreviations or verbiage that are unfamiliar. It may appear blunt or direct. Medical documents are intended to carry relevant information, facts as evident, and the clinical opinion of the practitioner.                 Medical Decision Making      Disposition and Plan     Clinical Impression:  1. Threatened  (HCC)         Disposition:  Discharge  2025  1:22 am    Follow-up:  Laurie Ibrahim MD  [address] [address]  [phone]    Call in 1 day(s)            Medications Prescribed:  Discharge Medication List as of 2025  1:24 AM              Supplementary Documentation: